# Patient Record
Sex: FEMALE | URBAN - METROPOLITAN AREA
[De-identification: names, ages, dates, MRNs, and addresses within clinical notes are randomized per-mention and may not be internally consistent; named-entity substitution may affect disease eponyms.]

---

## 2022-11-07 ENCOUNTER — TRANSITIONAL CARE UNIT VISIT (OUTPATIENT)
Dept: GERIATRICS | Facility: CLINIC | Age: 87
End: 2022-11-07
Payer: MEDICARE

## 2022-11-07 VITALS
TEMPERATURE: 98.5 F | WEIGHT: 172 LBS | RESPIRATION RATE: 16 BRPM | HEART RATE: 72 BPM | SYSTOLIC BLOOD PRESSURE: 151 MMHG | DIASTOLIC BLOOD PRESSURE: 68 MMHG | HEIGHT: 66 IN | BODY MASS INDEX: 27.64 KG/M2 | OXYGEN SATURATION: 97 %

## 2022-11-07 DIAGNOSIS — S82.424D CLOSED NONDISPLACED TRANSVERSE FRACTURE OF SHAFT OF RIGHT FIBULA WITH ROUTINE HEALING, SUBSEQUENT ENCOUNTER: ICD-10-CM

## 2022-11-07 DIAGNOSIS — I10 PRIMARY HYPERTENSION: ICD-10-CM

## 2022-11-07 DIAGNOSIS — G47.09 OTHER INSOMNIA: ICD-10-CM

## 2022-11-07 DIAGNOSIS — F10.10 ALCOHOL ABUSE: Primary | ICD-10-CM
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
## 2022-11-07 PROBLEM — H10.212: Status: ACTIVE | Noted: 2021-01-26

## 2022-11-07 PROBLEM — S82.424A CLOSED NONDISPLACED TRANSVERSE FRACTURE OF SHAFT OF RIGHT FIBULA: Status: ACTIVE | Noted: 2022-10-31

## 2022-11-07 PROBLEM — H01.006: Status: ACTIVE | Noted: 2018-11-13

## 2022-11-07 PROBLEM — H35.3290 EXUDATIVE AGE-RELATED MACULAR DEGENERATION (H): Status: ACTIVE | Noted: 2017-03-10

## 2022-11-07 PROBLEM — J45.40 MODERATE PERSISTENT ASTHMA WITHOUT COMPLICATION: Status: ACTIVE | Noted: 2022-10-31

## 2022-11-07 PROCEDURE — 99309 SBSQ NF CARE MODERATE MDM 30: CPT | Performed by: NURSE PRACTITIONER
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
## 2022-11-10 ENCOUNTER — TRANSITIONAL CARE UNIT VISIT (OUTPATIENT)
Dept: GERIATRICS | Facility: CLINIC | Age: 87
End: 2022-11-10
Payer: MEDICARE

## 2022-11-10 VITALS
RESPIRATION RATE: 18 BRPM | HEIGHT: 66 IN | TEMPERATURE: 98.7 F | HEART RATE: 77 BPM | OXYGEN SATURATION: 91 % | SYSTOLIC BLOOD PRESSURE: 155 MMHG | DIASTOLIC BLOOD PRESSURE: 69 MMHG | WEIGHT: 172 LBS | BODY MASS INDEX: 27.64 KG/M2

## 2022-11-10 DIAGNOSIS — G31.84 MILD COGNITIVE IMPAIRMENT: ICD-10-CM

## 2022-11-10 DIAGNOSIS — S82.891D CLOSED FRACTURE OF RIGHT ANKLE WITH ROUTINE HEALING, SUBSEQUENT ENCOUNTER: Primary | ICD-10-CM

## 2022-11-10 DIAGNOSIS — I10 ESSENTIAL HYPERTENSION: ICD-10-CM

## 2022-11-10 DIAGNOSIS — F10.11 HISTORY OF ALCOHOL ABUSE: ICD-10-CM

## 2022-11-10 DIAGNOSIS — J45.20 MILD INTERMITTENT ASTHMA WITHOUT COMPLICATION: ICD-10-CM

## 2022-11-10 PROCEDURE — 99305 1ST NF CARE MODERATE MDM 35: CPT | Performed by: FAMILY MEDICINE

## 2022-11-10 RX ORDER — ANTIOX #8/OM3/DHA/EPA/LUT/ZEAX 250-2.5 MG
1 CAPSULE ORAL DAILY
COMMUNITY
Start: 2022-11-03

## 2022-11-10 RX ORDER — ALBUTEROL SULFATE 90 UG/1
2 AEROSOL, METERED RESPIRATORY (INHALATION) 4 TIMES DAILY PRN
COMMUNITY
Start: 2022-11-03

## 2022-11-10 RX ORDER — ENOXAPARIN SODIUM 100 MG/ML
40 INJECTION SUBCUTANEOUS DAILY
COMMUNITY
Start: 2022-11-03 | End: 2022-11-29

## 2022-11-10 RX ORDER — AMLODIPINE BESYLATE 2.5 MG/1
2.5 TABLET ORAL DAILY
COMMUNITY

## 2022-11-10 RX ORDER — LANOLIN ALCOHOL/MO/W.PET/CERES
3 CREAM (GRAM) TOPICAL AT BEDTIME
COMMUNITY

## 2022-11-10 RX ORDER — FOLIC ACID 1 MG/1
1 TABLET ORAL DAILY
COMMUNITY
Start: 2022-11-04 | End: 2022-11-30

## 2022-11-10 RX ORDER — HYDROMORPHONE HYDROCHLORIDE 2 MG/1
2 TABLET ORAL EVERY 4 HOURS PRN
COMMUNITY
Start: 2022-11-03

## 2022-11-10 RX ORDER — ACETAMINOPHEN 500 MG
1000 TABLET ORAL EVERY 6 HOURS PRN
COMMUNITY
Start: 2022-11-03

## 2022-11-10 RX ORDER — LANOLIN ALCOHOL/MO/W.PET/CERES
100 CREAM (GRAM) TOPICAL DAILY
COMMUNITY
Start: 2022-11-04 | End: 2022-11-30

## 2022-11-10 RX ORDER — ESTRADIOL 0.1 MG/G
1 CREAM VAGINAL DAILY
COMMUNITY
Start: 2022-11-03
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
## 2022-11-11 ENCOUNTER — TRANSITIONAL CARE UNIT VISIT (OUTPATIENT)
Dept: GERIATRICS | Facility: CLINIC | Age: 87
End: 2022-11-11
Payer: MEDICARE

## 2022-11-11 VITALS
HEIGHT: 66 IN | HEART RATE: 71 BPM | OXYGEN SATURATION: 92 % | RESPIRATION RATE: 18 BRPM | WEIGHT: 171.9 LBS | SYSTOLIC BLOOD PRESSURE: 145 MMHG | BODY MASS INDEX: 27.63 KG/M2 | TEMPERATURE: 99.1 F | DIASTOLIC BLOOD PRESSURE: 68 MMHG

## 2022-11-11 DIAGNOSIS — J45.40 MODERATE PERSISTENT ASTHMA WITHOUT COMPLICATION: ICD-10-CM

## 2022-11-11 DIAGNOSIS — S82.424D CLOSED NONDISPLACED TRANSVERSE FRACTURE OF SHAFT OF RIGHT FIBULA WITH ROUTINE HEALING, SUBSEQUENT ENCOUNTER: ICD-10-CM

## 2022-11-11 DIAGNOSIS — J34.89 NASAL DRAINAGE: Primary | ICD-10-CM

## 2022-11-11 PROCEDURE — 99309 SBSQ NF CARE MODERATE MDM 30: CPT | Performed by: NURSE PRACTITIONER

## 2022-11-11 NOTE — PROGRESS NOTES
"[facility] GERIATRICS    Chief Complaint   Patient presents with     RECHECK     HPI:  Zena Mortensen is a 88 year old  (4/27/1934), who is being seen today for an episodic care visit at: [facility] ([facility]) [[id number]].     Patient is seen today in bed. She feels tired today. Her asthma gets worse and she uses her inhaler about 4 times every night in the past few days. She would like to keep her inhaler in the room. She has some clear nasal drainage. Denies sinus pain and cough. Denies fever and chills. She has SOB r/t asthma, baseline. She has no smoking hx. Her pain r/t R fibular fracture is 5/10 at rest and 3/10 after PRN dilaudid. Dilaudid is used about once per day. She is making progress with therapy. She sleeps well at night. Appetite is good. Denies n/v, chest pain, constipation and diarrhea. She has increased urinary frequency but that is her baseline.    She is seen sitting up on the side of the bed; is about to begin therapy. Reports mild cough; denies hx of smoking or copd, however her late  was a long time smoker. She tells me he was a radiologist and he did not believe that smoking caused cancer until he was diagnosed with lung cancer in his 50s and passed away. She tells me she was an outspoken advocate for getting the warning labels put on cigarette packs.   VS are stable. Will order albuterol and influenza swab; covid negative.      Allergies, and PMH/PSH reviewed in EPIC today.  REVIEW OF SYSTEMS:  10 point ROS of systems including Constitutional, Eyes, Respiratory, Cardiovascular, Gastroenterology, Genitourinary, Integumentary, Musculoskeletal, Psychiatric were all negative except for pertinent positives noted in my HPI.    Objective:   BP (!) 145/68   Pulse 71   Temp 99.1  F (37.3  C)   Resp 18   Ht 1.664 m (5' 5.5\")   Wt 78 kg (171 lb 14.4 oz)   SpO2 92%   BMI 28.17 kg/m    GENERAL APPEARANCE:  Alert, in no distress, cooperative  ENT:  Mouth and posterior " oropharynx normal, moist mucous membranes, normal hearing acuity  RESP:  respiratory effort and palpation of chest normal, diminished breath sounds in lower lobes bilaterally  CV:  Palpation and auscultation of heart done , regular rate and rhythm, no murmur, rub, or gallop, no edema  ABDOMEN:  normal bowel sounds, soft, nontender, no hepatosplenomegaly or other masses  M/S:   Gait and station abnormal s/p fibular fracture  Digits and nails normal  SKIN:  Inspection of skin and subcutaneous tissue baseline, Palpation of skin and subcutaneous tissue baseline  NEURO:   Cranial nerves 2-12 are normal tested and grossly at patient's baseline  PSYCH:  oriented X 3, affect and mood normal    Labs done in SNF are in [place] EPIC. Please refer to them using [facility] Everywhere.    Assessment/Plan:  (J34.89) Nasal drainage  (primary encounter diagnosis)  (J45.40) Moderate persistent asthma without complication  Comment: New onset of nasal drainage and worsened asthma.  Plan:   -Influenza swab and COVID PCR test  -albuterol inhaler    (S82.824D) Closed nondisplaced transverse fracture of shaft of right fibula with routine healing, subsequent encounter  Comment: acute, stable. Pain well controlled with Dilaudid  Plan: Continue Dilaudid    Electronically signed by: Maggy Montes CNP   I was present with the student who particpated in the serivce and documentation of the note. I have verified the history and personally peformed the physical exam and medical decision making. I agree with the assessment and plan of care as documented in the note.     Sid Sanabria, RN, NP student

## 2022-11-11 NOTE — LETTER
"    11/11/2022        RE: Zena Mortensen  [address]  [facility] [address]        [facility] GERIATRICS    Chief Complaint   Patient presents with     RECHECK     HPI:  Zena Mortensen is a 88 year old  (4/27/1934), who is being seen today for an episodic care visit at: [facility] ([facility]) [[id number]].     Patient is seen today in bed. She feels tired today. Her asthma gets worse and she uses her inhaler about 4 times every night in the past few days. She would like to keep her inhaler in the room. She has some clear nasal drainage. Denies sinus pain and cough. Denies fever and chills. She has SOB r/t asthma, baseline. She has no smoking hx. Her pain r/t R fibular fracture is 5/10 at rest and 3/10 after PRN dilaudid. Dilaudid is used about once per day. She is making progress with therapy. She sleeps well at night. Appetite is good. Denies n/v, chest pain, constipation and diarrhea. She has increased urinary frequency but that is her baseline.    She is seen sitting up on the side of the bed; is about to begin therapy. Reports mild cough; denies hx of smoking or copd, however her late  was a long time smoker. She tells me he was a radiologist and he did not believe that smoking caused cancer until he was diagnosed with lung cancer in his 50s and passed away. She tells me she was an outspoken advocate for getting the warning labels put on cigarette packs.   VS are stable. Will order albuterol and influenza swab; covid negative.      Allergies, and PMH/PSH reviewed in [facility] today.  REVIEW OF SYSTEMS:  10 point ROS of systems including Constitutional, Eyes, Respiratory, Cardiovascular, Gastroenterology, Genitourinary, Integumentary, Musculoskeletal, Psychiatric were all negative except for pertinent positives noted in my HPI.    Objective:   BP (!) 145/68   Pulse 71   Temp 99.1  F (37.3  C)   Resp 18   Ht 1.664 m (5' 5.5\")   Wt 78 kg (171 lb 14.4 oz)   SpO2 92%   BMI 28.17 " kg/m    GENERAL APPEARANCE:  Alert, in no distress, cooperative  ENT:  Mouth and posterior oropharynx normal, moist mucous membranes, normal hearing acuity  RESP:  respiratory effort and palpation of chest normal, diminished breath sounds in lower lobes bilaterally  CV:  Palpation and auscultation of heart done , regular rate and rhythm, no murmur, rub, or gallop, no edema  ABDOMEN:  normal bowel sounds, soft, nontender, no hepatosplenomegaly or other masses  M/S:   Gait and station abnormal s/p fibular fracture  Digits and nails normal  SKIN:  Inspection of skin and subcutaneous tissue baseline, Palpation of skin and subcutaneous tissue baseline  NEURO:   Cranial nerves 2-12 are normal tested and grossly at patient's baseline  PSYCH:  oriented X 3, affect and mood normal    Labs done in SNF are in [place] EPIC. Please refer to them using [employer] Everywhere.    Assessment/Plan:  (J34.89) Nasal drainage  (primary encounter diagnosis)  (J45.40) Moderate persistent asthma without complication  Comment: New onset of nasal drainage and worsened asthma.  Plan:   -Influenza swab and COVID PCR test  -albuterol inhaler    (S82.424D) Closed nondisplaced transverse fracture of shaft of right fibula with routine healing, subsequent encounter  Comment: acute, stable. Pain well controlled with Dilaudid  Plan: Continue Dilaudid    Electronically signed by: Maggy Montes CNP   I was present with the student who particpated in the serivce and documentation of the note. I have verified the history and personally peformed the physical exam and medical decision making. I agree with the assessment and plan of care as documented in the note.     Sid Sanabria RN, NP student            Sincerely,        Maggy Montes CNP

## 2022-11-12 ENCOUNTER — LAB REQUISITION (OUTPATIENT)
Dept: LAB | Facility: CLINIC | Age: 87
End: 2022-11-12
Payer: OTHER GOVERNMENT

## 2022-11-12 DIAGNOSIS — E03.9 HYPOTHYROIDISM, UNSPECIFIED: ICD-10-CM

## 2022-11-13 PROBLEM — I10 PRIMARY HYPERTENSION: Status: ACTIVE | Noted: 2022-11-13

## 2022-11-13 PROBLEM — G47.09 OTHER INSOMNIA: Status: ACTIVE | Noted: 2022-11-13

## 2022-11-14 LAB — TSH SERPL DL<=0.005 MIU/L-ACNC: 2.69 UIU/ML (ref 0.3–4.2)

## 2022-11-14 PROCEDURE — 84443 ASSAY THYROID STIM HORMONE: CPT | Performed by: NURSE PRACTITIONER

## 2022-11-14 PROCEDURE — P9604 ONE-WAY ALLOW PRORATED TRIP: HCPCS | Performed by: NURSE PRACTITIONER

## 2022-11-14 PROCEDURE — 36415 COLL VENOUS BLD VENIPUNCTURE: CPT | Performed by: NURSE PRACTITIONER

## 2022-11-15 ENCOUNTER — TRANSITIONAL CARE UNIT VISIT (OUTPATIENT)
Dept: GERIATRICS | Facility: CLINIC | Age: 87
End: 2022-11-15
Payer: OTHER GOVERNMENT

## 2022-11-15 VITALS
TEMPERATURE: 99.1 F | BODY MASS INDEX: 28.43 KG/M2 | SYSTOLIC BLOOD PRESSURE: 150 MMHG | DIASTOLIC BLOOD PRESSURE: 74 MMHG | OXYGEN SATURATION: 91 % | RESPIRATION RATE: 16 BRPM | HEART RATE: 85 BPM | WEIGHT: 176.9 LBS | HEIGHT: 66 IN

## 2022-11-15 DIAGNOSIS — S82.891D CLOSED FRACTURE OF RIGHT ANKLE WITH ROUTINE HEALING, SUBSEQUENT ENCOUNTER: Primary | ICD-10-CM

## 2022-11-15 DIAGNOSIS — J45.20 MILD INTERMITTENT ASTHMA WITHOUT COMPLICATION: ICD-10-CM

## 2022-11-15 DIAGNOSIS — I10 ESSENTIAL HYPERTENSION: ICD-10-CM

## 2022-11-15 DIAGNOSIS — F10.11 HISTORY OF ALCOHOL ABUSE: ICD-10-CM

## 2022-11-15 PROCEDURE — 99310 SBSQ NF CARE HIGH MDM 45: CPT | Performed by: FAMILY MEDICINE
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
